# Patient Record
(demographics unavailable — no encounter records)

---

## 2024-11-27 NOTE — HISTORY OF PRESENT ILLNESS
[FreeTextEntry1] : Ms. KATHY LARKIN is a 40-year-old female who presents for initial endocrine evaluation. Patient presents with regard to a history of multiple issues and requesting endocrine w/u with this regard.   The patient states that since she turned 40 years old, she has been experiencing sx of fatigue, difficulty losing weight, hair thinning, and significantly low libido.   Regarding the fatigue, she states that she has trouble staying asleep nocturnally. She will wake up and not go back to sleep for 1-2 hours.   Notes hair thinning since having her first child. She notes decreased hair volume. States she can make a ball out hair that she loses. Had been taking Nutrafol but stopped as felt it caused malodor of her breath.    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  She tries to go to the gym a few times weekly.  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   Regarding her weight, the patient states that prior to having kids, her ideal weight had been 125-130. However, she notes that over the years she has been struggling to get past 139lbs despite diet and exercise.   Diet: Has regular meals throughout the day. Has a lot of bananas, eggs, protein shakes, chicken, fish. States when she is being mindful, her meal is composed of a protein, carb, and a vegetable. When not being mindful, her meal will include mostly carbs.  - Reports bloating, especially with coffee, yogurt, and with some veggies. Has not been evaluated by GI   Activity: has been doing [employer] at least 2x weekly   Had been getting "extremely bad" abd cramping for about one full month, not associated with menses and states it felt worse than giving birth. Sx did mostly improve but still has dull cramping at times.  Also notes that she has lad left sided lower back pain and had MRI showing apparent slipped discs with no intervention needed. She is unsure if this is related to her abd cramping.   Denies any acne during adulthood, but did have when she was younger.   Menses generally regular but does come 3-5 days early. Nest cycle is in 1 week.  Libido low since having children.   *** She is not interested in pharmacotherapy for facilitation of weight loss***  Additional medical history is otherwise negative.  Surgeries: none    Allergies: Penicillin, pollen, animal dander   Current Medications: none  Supplements: Wellbell vitamin (started 3 days ago).    Social Hx: smokes cigarettes socially and has 3-6 alcoholic beverages weekly. Has tried Marijuana in the past    FHx: Father - heart disease and blood clots  Maternal grandmother - breast cancer and overweight  Paternal grandmother - mental illness

## 2024-11-27 NOTE — HISTORY OF PRESENT ILLNESS
[FreeTextEntry1] : Ms. KATHY LARKIN is a 40-year-old female who presents for initial endocrine evaluation. Patient presents with regard to a history of multiple issues and requesting endocrine w/u with this regard.   The patient states that since she turned 40 years old, she has been experiencing sx of fatigue, difficulty losing weight, hair thinning, and significantly low libido.   Regarding the fatigue, she states that she has trouble staying asleep nocturnally. She will wake up and not go back to sleep for 1-2 hours.   Notes hair thinning since having her first child. She notes decreased hair volume. States she can make a ball out hair that she loses. Had been taking Nutrafol but stopped as felt it caused malodor of her breath.    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  She tries to go to the gym a few times weekly.  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   Regarding her weight, the patient states that prior to having kids, her ideal weight had been 125-130. However, she notes that over the years she has been struggling to get past 139lbs despite diet and exercise.   Diet: Has regular meals throughout the day. Has a lot of bananas, eggs, protein shakes, chicken, fish. States when she is being mindful, her meal is composed of a protein, carb, and a vegetable. When not being mindful, her meal will include mostly carbs.  - Reports bloating, especially with coffee, yogurt, and with some veggies. Has not been evaluated by GI   Activity: has been doing [employer] at least 2x weekly   Had been getting "extremely bad" abd cramping for about one full month, not associated with menses and states it felt worse than giving birth. Sx did mostly improve but still has dull cramping at times.  Also notes that she has lad left sided lower back pain and had MRI showing apparent slipped discs with no intervention needed. She is unsure if this is related to her abd cramping.   Denies any acne during adulthood, but did have when she was younger.   Menses generally regular but does come 3-5 days early. Nest cycle is in 1 week.  Libido low since having children.   *** She is not interested in pharmacotherapy for facilitation of weight loss***  Additional medical history is otherwise negative.  Surgeries: none    Allergies: Penicillin, pollen, animal dander   Current Medications: none  Supplements: Wellbell vitamin (started 3 days ago).    Social Hx: smokes cigarettes socially and has 3-6 alcoholic beverages weekly. Has tried Marijuana in the past    FHx: Father - heart disease and blood clots  Maternal grandmother - breast cancer and overweight  Paternal grandmother - mental illness

## 2024-11-27 NOTE — ADDENDUM
[FreeTextEntry1] : This note was written by Gaurav Butterfield on 11/27/2024 acting as medical scribe for Dr. Gray Ames. I, Dr. Gray Ames, have read and attest that all the information, medical decision making and discharge instructions within are true and accurate.